# Patient Record
Sex: MALE | Race: WHITE | NOT HISPANIC OR LATINO | Employment: FULL TIME | URBAN - METROPOLITAN AREA
[De-identification: names, ages, dates, MRNs, and addresses within clinical notes are randomized per-mention and may not be internally consistent; named-entity substitution may affect disease eponyms.]

---

## 2017-02-03 ENCOUNTER — OFFICE VISIT (OUTPATIENT)
Dept: OTOLARYNGOLOGY | Facility: CLINIC | Age: 37
End: 2017-02-03
Payer: COMMERCIAL

## 2017-02-03 ENCOUNTER — PATIENT MESSAGE (OUTPATIENT)
Dept: OTOLARYNGOLOGY | Facility: CLINIC | Age: 37
End: 2017-02-03

## 2017-02-03 VITALS
HEART RATE: 85 BPM | SYSTOLIC BLOOD PRESSURE: 146 MMHG | WEIGHT: 156.94 LBS | DIASTOLIC BLOOD PRESSURE: 86 MMHG | BODY MASS INDEX: 23.87 KG/M2

## 2017-02-03 DIAGNOSIS — J38.3 VOCAL CORD MASS: Primary | ICD-10-CM

## 2017-02-03 DIAGNOSIS — J38.3 VOCAL PROCESS GRANULOMA: ICD-10-CM

## 2017-02-03 PROCEDURE — 99999 PR PBB SHADOW E&M-EST. PATIENT-LVL III: CPT | Mod: PBBFAC,,, | Performed by: OTOLARYNGOLOGY

## 2017-02-03 PROCEDURE — 31575 DIAGNOSTIC LARYNGOSCOPY: CPT | Mod: S$GLB,,, | Performed by: OTOLARYNGOLOGY

## 2017-02-03 PROCEDURE — 99213 OFFICE O/P EST LOW 20 MIN: CPT | Mod: 25,S$GLB,, | Performed by: OTOLARYNGOLOGY

## 2017-02-03 NOTE — Clinical Note
Please get him in to speech therapy.   I know he is seeing Carloz, but he should start the ST as soon as possible.  I forgot to tell him this.

## 2017-02-03 NOTE — PROGRESS NOTES
Referring Provider:    No referring provider defined for this encounter.  Subjective:   Patient: Juan Green [id number], :1980   Visit date:2/3/2017 12:18 PM    Chief Complaint:  Follow-up    HPI:  Juan is a 36 y.o. male who is here today for follow-up.  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  I last saw him in 2016 and he was doing very well.  He had a small, persistent nodular thickening at the vocal process at his 1 month visit.  I have had him on proton pump inhibitors since the time of surgery.  Additionally I recommended that he undergo speech therapy.  For unclear reasons this did not occur.  At this time he feels that his voice is fairly good and has been stable since the time of surgery.          FINAL PATHOLOGIC DIAGNOSIS  Right glottis mass:  Contact ulcer; see note.  Note: This lesion consists of hyperplastic squamous epithelium exhibiting pseudo-epitheliomatous hyperplasia,  exuberant granulation tissue with hemosiderin-laden macrophages, and acute and chronic inflammatory infiltrate in  acellular fibrinous material. There is no evidence of neoplasia.  [facility]  Diagnosed by: Carlo Hernandez M.D.  (Electronically Signed: 2016 11:12:03)      Findings at time of surgery:                                Review of Systems:  -     Allergic/Immunologic: has No Known Allergies..  -     Constitutional: Current temp:        His meds, allergies, medical, surgical, social & family histories were reviewed & updated:  -     He has a current medication list which includes the following prescription(s): fexofenadine, fluticasone, multivitamin, pantoprazole, and tegretol xr.  -     He  has a past medical history of Allergy; Asthma; and Seizures.   -     He  does not have any pertinent problems on file.   -     He  has no past surgical history on file.  -     He  reports that he quit smoking about 13 months ago. He has never used  smokeless tobacco. He reports that he does not drink alcohol or use illicit drugs.  -     His family history includes Asthma in his mother; Celiac disease in his father; Hypertension in his father; Mitral valve prolapse in his mother. There is no history of Stroke or Diabetes.  -     He has No Known Allergies.    Objective:     Physical Exam:  Vitals:    Visit Vitals    BP (!) 146/86    Pulse 85    Wt 71.2 kg (156 lb 15.5 oz)    BMI 23.87 kg/m2     General appearance:  Well developed, well nourished    Eyes:  Extraocular motions intact, PERRL    Communication:  Moderate dysphonia with a breathy quality to his voice.    Ears:  Otoscopy of external auditory canals and tympanic membranes was normal, clinical speech reception thresholds grossly intact, no mass/lesion of auricle.  Nose:  No masses/lesions of external nose, nasal mucosa, septum, and turbinates were within normal limits.  Mouth:  No mass/lesion of lips, teeth, gums, hard/soft palate, tongue, tonsils, or oropharynx.    Cardiovascular:  No pedal edema; Radial Pulses +2     Neck & Lymphatics:  No cervical lymphadenopathy, no neck mass/crepitus/ asymmetry, trachea is midline, no thyroid enlargement/tenderness/mass.    Psych: Oriented x3,  Alert with normal mood and affect.     Respiration/Chest:  Symmetric expansion during respiration, normal respiratory effort.    Skin:  Warm and intact. No ulcerations of face, scalp, neck.    Assessment & Plan:   Juan was seen today for follow-up.    Diagnoses and all orders for this visit:    Vocal cord mass  -     Ambulatory referral to ENT    Vocal process granuloma  -     Ambulatory referral to ENT      Juan is doing fairly well symptomatically but the area of concern seems to be continuing to enlarge.  I have recommended that he be evaluated by Dr. Thomas Carty.  Additionally I suspect that he would benefit greatly from speech therapy.  We will work on arranging this as well.      Patient: Juan Green  [id number], :1980  Procedure date:2/3/2017  Patient's medications, allergies, past medical, surgical, social and family histories were reviewed and updated as appropriate.  Chief Complaint:  Follow-up    HPI:  Juan is a 36 y.o. male with the history of present illness as discussed in the clinic note from today.    Procedure: Risks, benefits, and alternatives of the procedure were discussed with the patient, and the patient consented to the fiberoptic examination.  We applied a topical nasal decongestant and analgesic.  After adequate anesthesia was obtained, the flexible fiberoptic scope was passed into each nostril independently.  Each nasal cavity, the entire pharynx (nasopharynx to hypopharynx) and the larynx were visualized. At the end of the examination, the scope was removed. The patient tolerated the procedure well with no complications.     Findings:  -     Laryngeal mucosa is normal  -     Posterior commissure has no hypertrophy  -     Lingual tonsils have no hypertrophy  -     Adenoids have no hypertrophy  -     Right vocal fold: normal mobility     mass/lesion: nodular mass at vocal process without erythema or ulceration, larger than last visit, approx 6mm  -     Left vocal fold: Normal mobility     mass/lesion: none  -     Other findings: none    Assessment & Plan:  - see today's clinic note

## 2017-02-03 NOTE — MR AVS SNAPSHOT
[facility] - ENT  [address] [facility] [address] [place] [address]  Phone: [phone]  Fax: [phone]                  Juan Green   2/3/2017 10:15 AM   Office Visit    Description:  Male : 1980   Provider:  Gianni Lemon MD   Department:  [facility] - ENT           Reason for Visit     Follow-up           Diagnoses this Visit        Comments    Vocal cord mass    -  Primary     Vocal process granuloma                To Do List           Future Appointments        Provider Department Dept Phone    3/22/2017 1:00 PM Bulmaro Carty MD [facility] [phone]      Goals (5 Years of Data)     None      [facility] On [facility]     [facility] -  Assistance  Registered nurses in the [facility] Center provide clinical advisement, health education, appointment booking, and other advisory services.  Call for this free service at [phone].             Medications           Message regarding Medications     Verify the changes and/or additions to your medication regime listed below are the same as discussed with your clinician today.  If any of these changes or additions are incorrect, please notify your healthcare provider.             Verify that the below list of medications is an accurate representation of the medications you are currently taking.  If none reported, the list may be blank. If incorrect, please contact your healthcare provider. Carry this list with you in case of emergency.           Current Medications     fexofenadine (ALLEGRA) 180 MG tablet Take 90 mg by mouth once daily.     fluticasone (FLONASE) 50 mcg/actuation nasal spray instill 2 sprays into each nostril once daily    multivitamin (ONE DAILY MULTIVITAMIN) per tablet Take 1 tablet by mouth once daily.    pantoprazole (PROTONIX) 40 MG tablet Take 1 tablet (40 mg total) by mouth once daily.    TEGRETOL  mg Tb12 Take 400 mg by mouth 2 (two) times daily.           Clinical Reference  Information           Your Vitals Were     BP Pulse Weight BMI       146/86 85 71.2 kg (156 lb 15.5 oz) 23.87 kg/m2       Blood Pressure          Most Recent Value    BP  (!)  146/86      Allergies as of 2/3/2017     No Known Allergies      Immunizations Administered on Date of Encounter - 2/3/2017     None      Orders Placed During Today's Visit      Normal Orders This Visit    Ambulatory referral to ENT       Language Assistance Services     ATTENTION: Language assistance services are available, free of charge. Please call [phone].      ATENCIÓN: Si ashleyla barbie, tiene a navarro disposición servicios gratuitos de asistencia lingüística. Llame al [phone].     [facility] Ý: N?u b?n nói Ti?ng Vi?t, có các d?ch v? h? tr? ngôn ng? mi?n phí dành cho b?n. G?i s? [phone].         [facility] complies with applicable Federal civil rights laws and does not discriminate on the basis of race, color, national origin, age, disability, or sex.

## 2017-03-03 ENCOUNTER — CLINICAL SUPPORT (OUTPATIENT)
Dept: SPEECH THERAPY | Facility: HOSPITAL | Age: 37
End: 2017-03-03
Attending: OTOLARYNGOLOGY
Payer: COMMERCIAL

## 2017-03-03 DIAGNOSIS — J38.2 VOCAL CORD NODULES: Primary | ICD-10-CM

## 2017-03-03 PROCEDURE — 92524 BEHAVRAL QUALIT ANALYS VOICE: CPT | Mod: PO

## 2017-03-08 ENCOUNTER — TELEPHONE (OUTPATIENT)
Dept: OTOLARYNGOLOGY | Facility: CLINIC | Age: 37
End: 2017-03-08

## 2017-03-08 DIAGNOSIS — R49.0 DYSPHONIA: Primary | ICD-10-CM

## 2017-03-08 NOTE — TELEPHONE ENCOUNTER
----- Message from Bulmaro Carty MD sent at 3/7/2017  9:17 AM CST -----  No problem.     Can you put in an order for SLP voice eval? Brandie will get het scheduled with both of us.     Thanks,  JM    ----- Message -----     From: Gianni Lemon MD     Sent: 3/7/2017   9:08 AM       To: Bulmaro Carty MD    This is a karina that had a HUGE granuloma that I removed a few months ago.  He did fairly well but it looks like it is recurring.  I think he would benefit from seeing you as well as ST there in [place].

## 2017-03-09 NOTE — PATIENT INSTRUCTIONS
FUNCTIONAL GOALS  LTG. Pt will:  1. Reduce the frequency of abusive vocal behaviors by 80% below baseline data as measured by patient report.    STGs. Pt will:  1. Identify own abusive vocal behaviors.  2. Identify frequency/situations of own abusive behaviors.  3. Describe other behaviors which can replace vocal abuse (gentle vocal onset. reduce volume, appropriate respiration).  4. Use appropriate vocal behaviors in structured setting for 10 minutes.    RECOMMENDATIONS/PLAN OF CARE  -Recommend patient referral to a voice clinic for an instrumental voice assessment.  -Follow vocal hygiene practices as outlined on handout  -Consider voice amplifier   -Follow-up with referring physician and/or PCP as needed for medical care/management.  -Call provider at [phone] as needed

## 2017-03-22 ENCOUNTER — INITIAL CONSULT (OUTPATIENT)
Dept: OTOLARYNGOLOGY | Facility: CLINIC | Age: 37
End: 2017-03-22
Payer: COMMERCIAL

## 2017-03-22 ENCOUNTER — CLINICAL SUPPORT (OUTPATIENT)
Dept: SPEECH THERAPY | Facility: HOSPITAL | Age: 37
End: 2017-03-22
Payer: COMMERCIAL

## 2017-03-22 VITALS
BODY MASS INDEX: 23.64 KG/M2 | HEART RATE: 80 BPM | HEIGHT: 68 IN | WEIGHT: 156 LBS | DIASTOLIC BLOOD PRESSURE: 84 MMHG | SYSTOLIC BLOOD PRESSURE: 140 MMHG

## 2017-03-22 DIAGNOSIS — J38.3 VOCAL PROCESS GRANULOMA: ICD-10-CM

## 2017-03-22 DIAGNOSIS — R49.0 DYSPHONIA: Primary | ICD-10-CM

## 2017-03-22 PROCEDURE — G9171 VOICE CURRENT STATUS: HCPCS | Mod: GN,CJ | Performed by: SPEECH-LANGUAGE PATHOLOGIST

## 2017-03-22 PROCEDURE — 31579 LARYNGOSCOPY TELESCOPIC: CPT | Mod: S$GLB,,, | Performed by: OTOLARYNGOLOGY

## 2017-03-22 PROCEDURE — 99999 PR PBB SHADOW E&M-EST. PATIENT-LVL III: CPT | Mod: PBBFAC,,, | Performed by: OTOLARYNGOLOGY

## 2017-03-22 PROCEDURE — G9172 VOICE GOAL STATUS: HCPCS | Mod: GN,CH | Performed by: SPEECH-LANGUAGE PATHOLOGIST

## 2017-03-22 PROCEDURE — 99214 OFFICE O/P EST MOD 30 MIN: CPT | Mod: 25,S$GLB,, | Performed by: OTOLARYNGOLOGY

## 2017-03-22 PROCEDURE — 92520 LARYNGEAL FUNCTION STUDIES: CPT | Mod: GN,,, | Performed by: SPEECH-LANGUAGE PATHOLOGIST

## 2017-03-22 PROCEDURE — 1160F RVW MEDS BY RX/DR IN RCRD: CPT | Mod: S$GLB,,, | Performed by: OTOLARYNGOLOGY

## 2017-03-22 PROCEDURE — G9173 VOICE D/C STATUS: HCPCS | Mod: GN,CJ | Performed by: SPEECH-LANGUAGE PATHOLOGIST

## 2017-03-22 RX ORDER — PANTOPRAZOLE SODIUM 40 MG/1
40 TABLET, DELAYED RELEASE ORAL 2 TIMES DAILY
Qty: 30 TABLET | Refills: 11 | Status: SHIPPED | OUTPATIENT
Start: 2017-03-22 | End: 2018-04-03

## 2017-03-22 NOTE — Clinical Note
Dr. Lemon -- thanks for the referral.  If you agree with the plan of care (initiating voice therapy following voice rest), would you please create an addendum to my note to sign off on it?  Thanks again. AL

## 2017-03-22 NOTE — PROGRESS NOTES
"[facility]  Department of Otorhinolaryngology and Communication Sciences    Juan Green is a 36 y.o. male who presents to the Voice Center for consultation at the kind request of Dr. Gianni Lemon for further management of a vocal fold granuloma.     He complains of mild strain in his voice with prolonged use and some difficulty with his falsetto when singing. Onset was gradual. Duration is about 3 weeks. Time course is intermittent. Symptoms are slightly worsening. Exacerbating factors include singing and trying to project his voice. He denies any alleviating factors. Associated symptoms include throat clearing. He does endorse that is "probably a little bit of a bad habit."     A few months ago, he had similar symptoms which were much more severe. He saw Dr. Lemon who noted an obstructive lesion tethered to his posterior right vocal fold. He brought him for surgery on 11/16/2016. He underwent microlaryngoscopy with excision of right vocal process lesion. Path report is below:    FINAL PATHOLOGIC DIAGNOSIS  Right glottis mass:  Contact ulcer; see note.  Note: This lesion consists of hyperplastic squamous epithelium exhibiting pseudo-epitheliomatous hyperplasia,  exuberant granulation tissue with hemosiderin-laden macrophages, and acute and chronic inflammatory infiltrate in  acellular fibrinous material. There is no evidence of neoplasia.    His voice got better and was back to normal right after surgery, but it is beginning to deteriorate again. 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 He sang in the choir at [employer], but has not undergone any training since then. He is a tenor. However, he knows that, for much of his life, he has strained to hit notes which are higher than what he should. He has been on protonix daily since October. He denies any typical Ge reflux symptoms.    Voice Handicap Index-10 (VHI-10) score is 15. " "  Reflux Symptom Index (RSI) score is 7.       Past Medical History  He has a past medical history of Allergy; Asthma; and Seizures.    Past Surgical History  Epilepsy    Family History  His family history includes Asthma in his mother; Celiac disease in his father; Hypertension in his father; Mitral valve prolapse in his mother. There is no history of Stroke or Diabetes.    Social History  He reports that he quit smoking about 14 months ago. He has never used smokeless tobacco. He reports that he does not drink alcohol or use illicit drugs.    Allergies  He has No Known Allergies.    Medications  He has a current medication list which includes the following prescription(s): fexofenadine, fluticasone, multivitamin, pantoprazole, and tegretol xr.    Review of Systems   Constitutional: Positive for fever.        Recent strep infection, feeling better now   HENT: Positive for sore throat.    Eyes: Negative for visual disturbance.   Respiratory: Negative for wheezing.    Cardiovascular: Negative for chest pain.   Gastrointestinal: Negative for nausea.   Musculoskeletal: Positive for arthralgias.   Skin: Negative for rash.   Neurological: Negative for tremors.   Hematological: Does not bruise/bleed easily.   Psychiatric/Behavioral: The patient is not nervous/anxious.           Objective:     BP (!) 140/84 (BP Location: Right arm, Patient Position: Sitting, BP Method: Automatic)  Pulse 80  Ht 5' 8" (1.727 m)  Wt 70.8 kg (156 lb)  BMI 23.72 kg/m2   Physical Exam    Constitutional: comfortable, well dressed  Psychiatric: appropriate affect  Respiratory: comfortably breathing, symmetric chest rise, no stridor  Voice: normal for age and gender  Cardiovascular: upper extremities non-edematous  Lymphatic: no cervical lymphadenopathy  Neurologic: alert and oriented to time, place, person, and situation; cranial nerves 3-12 grossly intact  Head: normocephalic  Eyes: conjunctivae and sclerae clear  Ears: normal pinnae, normal " external auditory canals, tympanic membranes intact  Nose: mucosa pink and noncongested, no masses, no mucopurulence, no polyps  Oral cavity / oropharynx: no mucosal lesions  Neck: soft, full range of motion, laryngotracheal complex palpable with appropriate landmarks, larynx elevates on swallowing  Indirect laryngoscopy: limited due to gag    Procedure  Rigid Laryngeal Videostroboscopy (39697): Laryngeal videostroboscopy is indicated to assess the laryngeal vibratory biomechanics and vocal fold oscillation, which cannot be assessed with a plain light examination. This was carried out with a 70 degree endoscope. After verbal consent was obtained, the patient was positioned and the tongue was gently secured with a gauze sponge. The endoscope was passed transorally and positioned to image the larynx and hypopharynx in detail. The following featured were examined: laryngeal and hypopharyngeal masses; vocal fold range and symmetry of motion; laryngeal mucosal edema, erythema, inflammation, and hydration; salivary pooling; and gross laryngeal sensation. During phonation, the vocal folds were assesses for glottal closure; mucosal wave; vocal fold lesions; vibratory periodicity, amplitude, and phase symmetry; and vertical height match. The equipment was removed. The patient tolerated the procedure well without complication. 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       Plan:        I had a discussion with the patient regarding his condition and the further workup and management options. Management strategies for vocal process granulomas consist primarily of minimizing laryngeal inflammation and minimizing vocal process contact with the following measures: systemic steroids, anti-reflux medication,  and voice rest. If refractory to these measures, laryngeal botulinum toxin chemodenervation is usually successful in eradicating granulomas. The risks of laryngeal botulinum toxin chemodenervation were discussed at length with the patient.    At this time, he elected non-procedural treatment with acid suppression and voice rest. Due to poor tolerance of steroids, he deferred on this adjunct therapy. He will follow up with me in 2 weeks, or sooner if needed.    All questions were answered, and the patient is in agreement with the above.     Bulmaro Carty M.D.  [facility]  Department of Otorhinolaryngology and Communication Sciences

## 2017-03-22 NOTE — LETTER
March 27, 2017      Gianni Lemon MD  [address] [place] [address]           [facility]  [address]  [facility] [address]  Phone: [phone]  Fax: [phone]          Patient: Juan Green   MR Number: [id number]   YOB: 1980   Date of Visit: 3/22/2017       Dear Dr. Gianni Lemon:    Thank you for referring Juan Green to me for evaluation. Attached you will find relevant portions of my assessment and plan of care.    If you have questions, please do not hesitate to call me. I look forward to following Juan Green along with you.    Sincerely,    Bulmaro Carty MD    Enclosure  CC:  No Recipients    If you would like to receive this communication electronically, please contact [email or web address] or [phone] to request more information on [employer] Link access.    For providers and/or their staff who would like to refer a patient to [facility], please contact us through our one-stop-shop provider referral line, [facility], at [phone].    If you feel you have received this communication in error or would no longer like to receive these types of communications, please e-mail [email or web address]

## 2017-03-22 NOTE — PATIENT INSTRUCTIONS
GRANULOMA    What is a granuloma?   Granulomas are made up of grainy tissue that builds up in the larynx, usually at the back of the vocal folds near the cartilage called the vocal process. Granulomas can develop on just one or both sides. If it occurs on one side, it may hit against the other side, causing a contact lesion.   Many granulomas are caused by the reflux of stomach acid into the back of the throat, intubation, prolonged use of airway tubes after surgery, and some diseases like tuberculosis and HIV.     How is it treated?   Treatment of reflux is almost always one of the first steps to treat a granuloma, which involves a combination of medication and lifestyle changes. If the lesions do not respond to medication, treatment may proceed to a combination of microsurgery and/or voice therapy. Because granulomas commonly recur, the treatment program is usually comprehensive and aims to address several factors to reduce the chances of recurrence.         VOICE REST FOLLOWING SURGERY     After having laryngeal (voice box) surgery, your voice needs complete rest in order to heal. At your follow-up appointment, which will be 4-6 days later, we will look at your larynx and see how it is healing. Then we will discuss a modified voice rest plan to gradually increase your voice use. This schedule is a basic guideline; specific time periods for complete and modified voice rest will be tailored to you.     OVERALL GUIDELINES FOR VOICE REST   · Avoid coughing or throat clearing. If you feel the need to clear your throat, take a sip of water and swallow hard.   · Avoid strenuous exercise or activity. Any noise or straining activity to your vocal cords during this time can be damaging and affect how your vocal folds heal.   · Remain hydrated. Drink 8-10 glasses of water per day. Avoid caffeine, alcohol, and mentholated cough drops.   · Breathe steam several times per day, either from your shower, sink, or a personal  humidifier.   · If you have reflux, follow diet precautions and take medicine as prescribed.   · Avoid first- and second-hand smoke.     POST-OP VOICE :   Week 1 following surgery  Complete voice rest     · Avoid talking, whispering, throat clearing, coughing, singing, humming, laughing, whistling, or playing musical instruments.   · Use pen and paper to write message, or try an ramón on your smart phone/tablet. -- iPhone apps: [employer] have it!, [employer] [place] Text to Speech.  Android apps: Text to Speech toy.  · Arrange for appropriate  support.   · Change your outgoing voicemail message to redirect callers to email or text.      Keep in mind that this is a personalized progression. If you have any trouble, back up and do not progress until you are ready. Do not keep talking if your voice wears out or feels tired.         ACID REFLUX   What is acid reflux?    When we eat, food passes from the throat and into the stomach through a tube called the esophagus. At the bottom of the esophagus is a ring of muscles that acts as a valve between the esophagus and stomach, called the lower esophageal sphincter. Smoking, alcohol, and certain types of food may weaken the sphincter, so it may stop closing properly. The contents in the stomach then may leak back, or reflux, into the esophagus. This problem is called gastroesophageal reflux disease (GERD). Symptoms of GERD include heartburn, belching, regurgitation of stomach contents, and swallowing difficulties.    Sometimes, the stomach acid travels up through the esophagus and spills into the larynx or pharynx (voice box). This is called laryngopharyngeal reflux (LPR) and is irritating to the vocal folds and surrounding tissues. Often, patients with LPR do not experience heartburn as a symptom. More commonly, symptoms of LPR include hoarseness, excessive mucous resulting in frequent throat clearing, post-nasal drip, coughing, throat soreness or burning, choking episodes,  difficulty swallowing, and sensation of a lump in the throat.     How is acid reflux treated?   Treatment for acid reflux can involve any combination of medication, lifestyle modifications, and surgery.   · Medications. Your doctor may prescribe a proton pump inhibitor (PPI) or an H2 blocker. If you are prescribed a PPI, take in on an empty stomach in the morning 30 minutes prior to eating breakfast. Keep in mind that it may take 4-6 weeks before symptoms begin to resolve, so do not stop medications without consulting your doctor.   · Lifestyle and dietary modifications. Eat smaller meals at a slower pace. Avoid over-eating. If you are overweight, try to lose weight. Do not lie down or exercise directly after eating; eat your last meal of the day at least 2-3 hours prior to going to sleep. Avoid tight-fitting clothes. If you are a smoker, reduce or quit smoking. Elevate your head of bed 4-6 inches by putting phone books under the legs at the head of your bed or buy a wedge pillow, but do not use more than two regular pillows as this causes the body to curl and compresses your stomach.     Food group Foods to avoid to reduce reflux   Beverages  Whole milk, 2% milk, chocolate milk/hot chocolate, alcohol, coffee (regular and decaf), caffeinated tea, mint tea, carbonated beverages, citrus juice    Breads/grains Commercial sweet rolls, doughnuts, croissants, and other high-fat pastries    Fruits and vegetables Fried or cream-style vegetables, tomatoes, tomato-based products, citrus fruits, hot peppers    Soups and seasonings Cream, cheese, tomato-based soups, vinegar    Meats and proteins Fatty or fried meat/fish, min, sausage, pepperoni, lunch meat, fried eggs    Fats and oil Lard, min drippings, salt pork, meat drippings, gravies, highly seasoned salad dressings, nuts    Sweets/desserts Anything made with or from chocolate, peppermint, spearmint, whole milk, or cream; high-fat pastries, gum, hard candy

## 2017-03-22 NOTE — MR AVS SNAPSHOT
[facility]  [address]  [facility] [address]  Phone: [phone]  Fax: [phone]                  Juan Green   3/22/2017 1:00 PM   Initial consult    Description:  Male : 1980   Provider:  Bulmaro Carty MD   Department:  [facility]           Reason for Visit     Mass           Diagnoses this Visit        Comments    Dysphonia    -  Primary     Vocal process granuloma                To Do List           Goals (5 Years of Data)     None      Follow-Up and Disposition     Return in about 2 weeks (around 2017).       These Medications        Disp Refills Start End    pantoprazole (PROTONIX) 40 MG tablet 30 tablet 11 3/22/2017 3/22/2018    Take 1 tablet (40 mg total) by mouth 2 (two) times daily. - Oral    Pharmacy: [facility]/pharmacy #[id number] [address] Ph #: [phone]       ranitidine (ZANTAC) 300 MG tablet 30 tablet 11 3/22/2017 3/22/2018    Take 1 tablet (300 mg total) by mouth every evening. - Oral    Pharmacy: [facility]/pharmacy #[id number] [address] Ph #: [phone]         [facility]     [facility] On Call Nurse Care Line -  Assistance  Registered nurses in the [facility] Center provide clinical advisement, health education, appointment booking, and other advisory services.  Call for this free service at [phone].             Medications           Message regarding Medications     Verify the changes and/or additions to your medication regime listed below are the same as discussed with your clinician today.  If any of these changes or additions are incorrect, please notify your healthcare provider.        START taking these NEW medications        Refills    ranitidine (ZANTAC) 300 MG tablet 11    Sig: Take 1 tablet (300 mg total) by mouth every evening.    Class: Normal    Route: Oral      CHANGE how you are taking  "these medications     Start Taking Instead of    pantoprazole (PROTONIX) 40 MG tablet pantoprazole (PROTONIX) 40 MG tablet    Dosage:  Take 1 tablet (40 mg total) by mouth 2 (two) times daily. Dosage:  Take 1 tablet (40 mg total) by mouth once daily.    Reason for Change:  Reorder            Verify that the below list of medications is an accurate representation of the medications you are currently taking.  If none reported, the list may be blank. If incorrect, please contact your healthcare provider. Carry this list with you in case of emergency.           Current Medications     fexofenadine (ALLEGRA) 180 MG tablet Take 90 mg by mouth once daily.     fluticasone (FLONASE) 50 mcg/actuation nasal spray instill 2 sprays into each nostril once daily    multivitamin (ONE DAILY MULTIVITAMIN) per tablet Take 1 tablet by mouth once daily.    pantoprazole (PROTONIX) 40 MG tablet Take 1 tablet (40 mg total) by mouth 2 (two) times daily.    ranitidine (ZANTAC) 300 MG tablet Take 1 tablet (300 mg total) by mouth every evening.    TEGRETOL  mg Tb12 Take 400 mg by mouth 2 (two) times daily.           Clinical Reference Information           Your Vitals Were     BP Pulse Height Weight BMI    140/84 (BP Location: Right arm, Patient Position: Sitting, BP Method: Automatic) 80 5' 8" (1.727 m) 70.8 kg (156 lb) 23.72 kg/m2      Blood Pressure          Most Recent Value    BP  (!)  140/84      Allergies as of 3/22/2017     No Known Allergies      Immunizations Administered on Date of Encounter - 3/22/2017     None      Instructions      GRANULOMA    What is a granuloma?   Granulomas are made up of grainy tissue that builds up in the larynx, usually at the back of the vocal folds near the cartilage called the vocal process. Granulomas can develop on just one or both sides. If it occurs on one side, it may hit against the other side, causing a contact lesion.   Many granulomas are caused by the reflux of stomach acid into the " back of the throat, intubation, prolonged use of airway tubes after surgery, and some diseases like tuberculosis and HIV.     How is it treated?   Treatment of reflux is almost always one of the first steps to treat a granuloma, which involves a combination of medication and lifestyle changes. If the lesions do not respond to medication, treatment may proceed to a combination of microsurgery and/or voice therapy. Because granulomas commonly recur, the treatment program is usually comprehensive and aims to address several factors to reduce the chances of recurrence.         VOICE REST FOLLOWING SURGERY     After having laryngeal (voice box) surgery, your voice needs complete rest in order to heal. At your follow-up appointment, which will be 4-6 days later, we will look at your larynx and see how it is healing. Then we will discuss a modified voice rest plan to gradually increase your voice use. This schedule is a basic guideline; specific time periods for complete and modified voice rest will be tailored to you.     OVERALL GUIDELINES FOR VOICE REST   · Avoid coughing or throat clearing. If you feel the need to clear your throat, take a sip of water and swallow hard.   · Avoid strenuous exercise or activity. Any noise or straining activity to your vocal cords during this time can be damaging and affect how your vocal folds heal.   · Remain hydrated. Drink 8-10 glasses of water per day. Avoid caffeine, alcohol, and mentholated cough drops.   · Breathe steam several times per day, either from your shower, sink, or a personal humidifier.   · If you have reflux, follow diet precautions and take medicine as prescribed.   · Avoid first- and second-hand smoke.     POST-OP VOICE :   Week 1 following surgery  Complete voice rest     · Avoid talking, whispering, throat clearing, coughing, singing, humming, laughing, whistling, or playing musical instruments.   · Use pen and paper to write message, or try an ramón on your  smart phone/tablet. -- iPhone apps: Player X, [employer] Text to Speech.  Android apps: Text to Speech toy.  · Arrange for appropriate  support.   · Change your outgoing voicemail message to redirect callers to email or text.      Keep in mind that this is a personalized progression. If you have any trouble, back up and do not progress until you are ready. Do not keep talking if your voice wears out or feels tired.         ACID REFLUX   What is acid reflux?    When we eat, food passes from the throat and into the stomach through a tube called the esophagus. At the bottom of the esophagus is a ring of muscles that acts as a valve between the esophagus and stomach, called the lower esophageal sphincter. Smoking, alcohol, and certain types of food may weaken the sphincter, so it may stop closing properly. The contents in the stomach then may leak back, or reflux, into the esophagus. This problem is called gastroesophageal reflux disease (GERD). Symptoms of GERD include heartburn, belching, regurgitation of stomach contents, and swallowing difficulties.    Sometimes, the stomach acid travels up through the esophagus and spills into the larynx or pharynx (voice box). This is called laryngopharyngeal reflux (LPR) and is irritating to the vocal folds and surrounding tissues. Often, patients with LPR do not experience heartburn as a symptom. More commonly, symptoms of LPR include hoarseness, excessive mucous resulting in frequent throat clearing, post-nasal drip, coughing, throat soreness or burning, choking episodes, difficulty swallowing, and sensation of a lump in the throat.     How is acid reflux treated?   Treatment for acid reflux can involve any combination of medication, lifestyle modifications, and surgery.   · Medications. Your doctor may prescribe a proton pump inhibitor (PPI) or an H2 blocker. If you are prescribed a PPI, take in on an empty stomach in the morning 30 minutes prior to eating  breakfast. Keep in mind that it may take 4-6 weeks before symptoms begin to resolve, so do not stop medications without consulting your doctor.   · Lifestyle and dietary modifications. Eat smaller meals at a slower pace. Avoid over-eating. If you are overweight, try to lose weight. Do not lie down or exercise directly after eating; eat your last meal of the day at least 2-3 hours prior to going to sleep. Avoid tight-fitting clothes. If you are a smoker, reduce or quit smoking. Elevate your head of bed 4-6 inches by putting phone books under the legs at the head of your bed or buy a wedge pillow, but do not use more than two regular pillows as this causes the body to curl and compresses your stomach.     Food group Foods to avoid to reduce reflux   Beverages  Whole milk, 2% milk, chocolate milk/hot chocolate, alcohol, coffee (regular and decaf), caffeinated tea, mint tea, carbonated beverages, citrus juice    Breads/grains Commercial sweet rolls, doughnuts, croissants, and other high-fat pastries    Fruits and vegetables Fried or cream-style vegetables, tomatoes, tomato-based products, citrus fruits, hot peppers    Soups and seasonings Cream, cheese, tomato-based soups, vinegar    Meats and proteins Fatty or fried meat/fish, min, sausage, pepperoni, lunch meat, fried eggs    Fats and oil Lard, min drippings, salt pork, meat drippings, gravies, highly seasoned salad dressings, nuts    Sweets/desserts Anything made with or from chocolate, peppermint, spearmint, whole milk, or cream; high-fat pastries, gum, hard candy          Language Assistance Services     ATTENTION: Language assistance services are available, free of charge. Please call [phone].      ATENCIÓN: Si habla español, tiene a navarro disposición servicios gratuitos de asistencia lingüística. Llame al [phone].     KASH Ý: N?u b?n nói Ti?ng Vi?t, có các d?ch v? h? tr? ngôn ng? mi?n phí dành cho b?n. G?i s? [phone].         Shakir Joaquin -  [facility] complies with applicable Federal civil rights laws and does not discriminate on the basis of race, color, national origin, age, disability, or sex.

## 2017-03-22 NOTE — PROGRESS NOTES
"Referring provider: Dr. Gianni Lemon  Reason for visit:  Laryngeal function studies (CPT 01644)    Subjective / History    Juan Green is a 36 y.o. male referred for laryngeal function studies (CPT 66070) by Dr. Lemon.  He is s/p direct laryngoscopy with excision of large mass off the right true vocal fold with attachment along the posterior 1/3 of the vocal fold on 11/16.  Per Dr. Lemon's recent note, lesion was enlarging at his 2-3 month f/u visit.  He underwent SLP voice eval on 3/3/17 and was [facility] to the [facility] for further evaluation.  See Dr. Carty's note from 3/22 for stroboscopic findings -- noted R vocal process bilobed granuloma.      Past Medical History:   Diagnosis Date    Allergy     Asthma     childhood    Seizures     last seizure 2007     Current Outpatient Prescriptions on File Prior to Visit   Medication Sig Dispense Refill    fexofenadine (ALLEGRA) 180 MG tablet Take 90 mg by mouth once daily.       fluticasone (FLONASE) 50 mcg/actuation nasal spray instill 2 sprays into each nostril once daily 16 g 2    multivitamin (ONE DAILY MULTIVITAMIN) per tablet Take 1 tablet by mouth once daily.      pantoprazole (PROTONIX) 40 MG tablet Take 1 tablet (40 mg total) by mouth once daily. 30 tablet 11    TEGRETOL  mg Tb12 Take 400 mg by mouth 2 (two) times daily.  0     No current facility-administered medications on file prior to visit.        Objective    Perceptual/behavioral assessment  -Quality: appropriate for age and gender  -Volume: appropriate for age and gender  -Pitch: appropriate for age and gender  -Flexibility: appropriate for age and gender  -Habitual respiratory pattern: diaphragmatic.    Acoustic/aerodynamic assessment  Multi-Dimensional Voice Program (MDVP) Analysis (sustained "ah")   Baseline Gender-matched norms (M)   Average fundamental frequency (Fo) 159.1 Hz Norm/SD: 145.2 / 23.41     Relative average perturbation 0.275% Norm/SD: 0.345 / 0.33     Shimmer percent " 2.332% Norm/SD: 2.52 / 0.997     Noise to harmonic ratio 0.124 Norm/SD: 0.12 / 0.014     Voice turbulence index 0.04 Norm/SD: 0.05 / 0.016       Phonatory Aerodynamic System (PAS) Analysis (running speech)  Maximum SPL   82.90  dB  Mean Pitch   117.03 Hz  Pitch Range   164.17 Hz  Phonation Time  11.28  Sec  Expiratory Airflow Duration 15.12  Sec  Inspiratory Airflow Duration 2.17  Sec  Peak Expiratory Airflow 1.05  Lit/Sec  Expiratory Volume  2.36  Liters  Peak Inspiratory Airflow -1.67  Lit/Sec  Inspiratory Volume  -1.37  Liters    Education / Stimulability Trials   Discussed importance of vocal hygiene including: hydration, conservation and reducing throat clearing, coughing, other phonotraumatic behaviors. Discussed protocol for modified voice rest, which he would like to try for the next 1-2 weeks in order to reduce size of granuloma.  Discussed GERD precautions including foods to avoid and strategies including avoiding laying down on an empty stomach or leaning over soon after a meal.   Also instructed pt on straw phonation exercises for reduced post glottic impact during phonation and to reduce extralaryngeal tension on phonation.  Discussed cough suppression/swallowing in place of coughing.     Functional goals  Length Status Goal   Long term Initiated Patient will implement and adhere to vocal hygiene protocols on a daily basis, including the elimination of phonotraumatic behaviors.    Long term Initiated Patient and clinician will facilitate changes in vocal function in order to restore functional use of voice for daily occupational, social, and emotional demands.    Long term Initiated Patient will re-establish phonation with adequate balance of airflow and resonance with decreased muscle tension.    Short term Initiated Patient will adhere to voice rest per protocol for 2 weeks.       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 Lemon.   Prognosis for continued improvement is good.     G-Codes for Voice  Current status:   - CJ   Projected status:  - CH   Discharge status:  - CJ     Recommendations / POC    Recommend 2-3 sessions of voice/speech therapy over 3-5 weeks with a speech-language pathologist to optimize glottal postures for improved vocal function, vocal efficiency, and ease of phonation.  He plans to follow up in two weeks for re-assessment of larynx with Dr. Carty and initiation of voice therapy.

## 2017-03-31 ENCOUNTER — PATIENT MESSAGE (OUTPATIENT)
Dept: FAMILY MEDICINE | Facility: CLINIC | Age: 37
End: 2017-03-31

## 2017-03-31 DIAGNOSIS — G40.909 SEIZURE DISORDER: Primary | ICD-10-CM

## 2017-03-31 NOTE — TELEPHONE ENCOUNTER
Please schedule with neurology and write a letter stating that the patient is under my care and has not smoked since January 2016.

## 2017-04-03 ENCOUNTER — PATIENT MESSAGE (OUTPATIENT)
Dept: FAMILY MEDICINE | Facility: CLINIC | Age: 37
End: 2017-04-03

## 2017-04-05 ENCOUNTER — OFFICE VISIT (OUTPATIENT)
Dept: OTOLARYNGOLOGY | Facility: CLINIC | Age: 37
End: 2017-04-05
Payer: COMMERCIAL

## 2017-04-05 VITALS
SYSTOLIC BLOOD PRESSURE: 122 MMHG | TEMPERATURE: 97 F | BODY MASS INDEX: 24.07 KG/M2 | WEIGHT: 158.31 LBS | DIASTOLIC BLOOD PRESSURE: 75 MMHG | HEART RATE: 71 BPM

## 2017-04-05 DIAGNOSIS — R49.0 DYSPHONIA: Primary | ICD-10-CM

## 2017-04-05 DIAGNOSIS — R49.9 VOICE DISTURBANCE: ICD-10-CM

## 2017-04-05 DIAGNOSIS — J38.5 SPASM LARYNX: ICD-10-CM

## 2017-04-05 PROCEDURE — 31579 LARYNGOSCOPY TELESCOPIC: CPT | Mod: S$GLB,,, | Performed by: OTOLARYNGOLOGY

## 2017-04-05 PROCEDURE — 99999 PR PBB SHADOW E&M-EST. PATIENT-LVL III: CPT | Mod: PBBFAC,,, | Performed by: OTOLARYNGOLOGY

## 2017-04-05 PROCEDURE — 1160F RVW MEDS BY RX/DR IN RCRD: CPT | Mod: S$GLB,,, | Performed by: OTOLARYNGOLOGY

## 2017-04-05 PROCEDURE — 99214 OFFICE O/P EST MOD 30 MIN: CPT | Mod: 25,S$GLB,, | Performed by: OTOLARYNGOLOGY

## 2017-04-05 NOTE — PATIENT INSTRUCTIONS
The purpose of the Botox injection into the larynx is to cause a temporary weakening of the vocal fold muscles in order to reduce the voice spasms.  It takes several days to begin working, but after the first few days you will notice a decrease in the spasms.      Basic Botox guidelines  1. Within 24-72 hours, you should expect your voice to become weak and breathy.  After one week, call into the Voice Center at [phone] and leave a message so our team can hear your voice and  the effectiveness of the injection.   We will not call you back unless you ask us to do so.      2. After 1-2 weeks, your voice should start to get stronger and have fewer spasms.  The stronger voice typically lasts for about three months, but there is some variability.    3. You may experience some difficulties with swallowing for the first week after your injection, but these should be short-lived.  Take extra care when drinking thin liquids such as water, coffee, juice, soda, and tea.  If you do find yourself coughing frequently with liquids, try tucking your chin while you swallow, or thickening your liquids.  Thick-It, a liquid thickener, can be purchased at most pharmacies.    If you have any questions, please call our office at [phone], or contact us through the [facility] portal.

## 2017-04-05 NOTE — MR AVS SNAPSHOT
[facility]  [address]  [facility] [address]  Phone: [phone]  Fax: [phone]                  Juan Green   2017 4:00 PM   Office Visit    Description:  Male : 1980   Provider:  Bulmaro Carty MD   Department:  [facility]           Reason for Visit     Follow-up           Diagnoses this Visit        Comments    Dysphonia    -  Primary     Voice disturbance         Spasm larynx                To Do List           Future Appointments        Provider Department Dept Phone    2017 2:00 PM Neeru Zaragoza MD [facility] Neurology [phone]      Goals (5 Years of Data)     None      Follow-Up and Disposition     Return for laryngeal Botox injection.      [facility]     [facility] On [facility] Nurse Care Line -  Assistance  Unless otherwise directed by your provider, please contact [facility], our nurse care line that is available for  assistance.     Registered nurses in the [facility] Call Center provide: appointment scheduling, clinical advisement, health education, and other advisory services.  Call: [phone] (toll free)               Medications           Message regarding Medications     Verify the changes and/or additions to your medication regime listed below are the same as discussed with your clinician today.  If any of these changes or additions are incorrect, please notify your healthcare provider.             Verify that the below list of medications is an accurate representation of the medications you are currently taking.  If none reported, the list may be blank. If incorrect, please contact your healthcare provider. Carry this list with you in case of emergency.           Current Medications     fexofenadine (ALLEGRA) 180 MG tablet Take 90 mg by mouth once daily.     fluticasone (FLONASE) 50 mcg/actuation nasal spray instill 2 sprays into each nostril once daily    multivitamin (ONE  DAILY MULTIVITAMIN) per tablet Take 1 tablet by mouth once daily.    pantoprazole (PROTONIX) 40 MG tablet Take 1 tablet (40 mg total) by mouth 2 (two) times daily.    ranitidine (ZANTAC) 300 MG tablet Take 1 tablet (300 mg total) by mouth every evening.    TEGRETOL  mg Tb12 Take 400 mg by mouth 2 (two) times daily.           Clinical Reference Information           Your Vitals Were     BP Pulse Temp Weight BMI    122/75 71 96.9 °F (36.1 °C) 71.8 kg (158 lb 4.6 oz) 24.07 kg/m2      Blood Pressure          Most Recent Value    BP  122/75      Allergies as of 4/5/2017     No Known Allergies      Immunizations Administered on Date of Encounter - 4/5/2017     None      Instructions                The purpose of the Botox injection into the larynx is to cause a temporary weakening of the vocal fold muscles in order to reduce the voice spasms.  It takes several days to begin working, but after the first few days you will notice a decrease in the spasms.      Basic Botox guidelines  1. Within 24-72 hours, you should expect your voice to become weak and breathy.  After one week, call into the Voice Center at [phone] and leave a message so our team can hear your voice and  the effectiveness of the injection.   We will not call you back unless you ask us to do so.      2. After 1-2 weeks, your voice should start to get stronger and have fewer spasms.  The stronger voice typically lasts for about three months, but there is some variability.    3. You may experience some difficulties with swallowing for the first week after your injection, but these should be short-lived.  Take extra care when drinking thin liquids such as water, coffee, juice, soda, and tea.  If you do find yourself coughing frequently with liquids, try tucking your chin while you swallow, or thickening your liquids.  Thick-It, a liquid thickener, can be purchased at most pharmacies.    If you have any questions, please call our office at  [phone], or contact us through the [facility] portal.           Language Assistance Services     ATTENTION: Language assistance services are available, free of charge. Please call [phone].      ATENCIÓN: Si ata valentin, tiene a navarro disposición servicios gratuitos de asistencia lingüística. Llame al [phone].     CHÚ Ý: N?u b?n nói Ti?ng Vi?t, có các d?ch v? h? tr? ngôn ng? mi?n phí dành cho b?n. G?i s? [phone].         [facility] complies with applicable Federal civil rights laws and does not discriminate on the basis of race, color, national origin, age, disability, or sex.

## 2017-04-10 NOTE — PROGRESS NOTES
[facility]  Department of Otorhinolaryngology and Communication Sciences    Subjective:      Juan Green is a 36 y.o. male who presents for follow-up. 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 Path was benign.    Since his last visit, he has adhered to complete voice rest. He is maintaining acid-suppression. He has deferred on PO steroids due to poor tolerance. He began speaking yesterday and notes that his voice quality is weak and irregular.    The patient's medications, allergies, past medical, surgical, social and family histories were reviewed and updated as appropriate.    A detailed review of systems was obtained with pertinent positives as per the above HPI, and otherwise negative.      Objective:     /75  Pulse 71  Temp 96.9 °F (36.1 °C)  Wt 71.8 kg (158 lb 4.6 oz)  BMI 24.07 kg/m2     Constitutional: comfortable, well dressed  Psychiatric: appropriate affect  Respiratory: comfortably breathing, symmetric chest rise, no stridor  Voice: mild asthenia, mild strain, trace breathiness  Head: normocephalic  Eyes: conjunctivae and sclerae clear  Indirect laryngoscopy is limited due to gag    Procedure  Rigid Laryngeal Videostroboscopy (38375): Laryngeal videostroboscopy is indicated to assess the laryngeal vibratory biomechanics and vocal fold oscillation, which cannot be assessed with a plain light examination. This was carried out with a 70 degree endoscope. After verbal consent was obtained, the patient was positioned and the tongue was gently secured with a gauze sponge. The endoscope was passed transorally and positioned to image the larynx and hypopharynx in detail. The following featured were examined: laryngeal and hypopharyngeal masses; vocal fold range and symmetry of motion; laryngeal mucosal edema, erythema, inflammation, and hydration; salivary pooling; and gross laryngeal sensation. During phonation, the vocal folds  were assesses for glottal closure; mucosal wave; vocal fold lesions; vibratory periodicity, amplitude, and phase symmetry; and vertical height match. The equipment was removed. The patient tolerated the procedure well without complication. All findings were normal except:  - bilobed right vocal process granuloma, pale  - premature contact, impairing posterior glottic closure  - trace glottal insufficiency  - concentric supraglottic hyperfunction/larynegal spasm      Assessment:     Juan Green is a 36 y.o. 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 This has failed conservative treatment approach.     Plan:     Options discussed with the patient included no treatment, office-based laryngeal Botox injection, or surgical treatment. Should he desire surgical treatment, I would recommend adjunct Botox and/or steroid injection. The risks and benefits of each approach were discussed with the patient. He desired office-based Botox injection. The risks of laryngeal botulinum toxin chemodenervation were discussed at length with the patient. Risks included but were not limited to pain, bleeding, infection, worsening of voice, worsening of swallowing, dysphagia, aspiration, shortness of breath, noisy breathing, airway obstruction, need for additional injections or procedures, reaction to medication, and development of tolerance to the medication. All questions were answered, and the patient wishes to proceed. We will arrange this in the coming weeks.    Bulmaro Carty M.D.  [facility]  Department of Otorhinolaryngology and Communication Sciences

## 2017-04-19 ENCOUNTER — PROCEDURE VISIT (OUTPATIENT)
Dept: OTOLARYNGOLOGY | Facility: CLINIC | Age: 37
End: 2017-04-19
Payer: COMMERCIAL

## 2017-04-19 VITALS
SYSTOLIC BLOOD PRESSURE: 142 MMHG | HEART RATE: 76 BPM | TEMPERATURE: 97 F | DIASTOLIC BLOOD PRESSURE: 84 MMHG | RESPIRATION RATE: 20 BRPM

## 2017-04-19 DIAGNOSIS — J38.3 VOCAL PROCESS GRANULOMA: ICD-10-CM

## 2017-04-19 DIAGNOSIS — R49.9 VOICE DISTURBANCE: ICD-10-CM

## 2017-04-19 DIAGNOSIS — J38.5 SPASM LARYNX: Primary | ICD-10-CM

## 2017-04-19 PROCEDURE — 64617 CHEMODENER MUSCLE LARYNX EMG: CPT | Mod: 50,S$GLB,, | Performed by: OTOLARYNGOLOGY

## 2017-04-19 NOTE — PROCEDURES
Procedures   [facility]  Department of Otorhinolaryngology and Communication Sciences    Preoperative Diagnosis: 1. Vocal fold granuloma.  2. Laryngeal spasm.    Postoperative Diagnosis: 1. Vocal fold granuloma.  2. Laryngeal spasm.    Procedure: Chemodenervation of larynx, percutaneous, under guidance of needle electromyography, bilateral thyroarytenoid/lateral cricoarytenoid complex.    Toxin serotype used: Botox.    Total units employed:   1. Left TA/LCA - 2.5 units.  2. Right TA/LCA - 2.5 units.  3. 45 units wasted.    Surgeon: Bulmaro Carty M.D.     Estimated blood loss: None.    Anesthesia: Local.     Complications: None.    Procedure in detail: Juan Green was positively identified with two identifiers and was seated upright in a comfortable position. Final time-out was performed for verification purposes. Local cutaneous and subcutaneous anesthesia was achieved with 1 mL of 1% lidocaine with epinephrine 1:100,000, injected into the skin and subcutaneous tissues at the level of the cricothyroid membrane. Surface electrodes were connected. Botulinum toxin was reconstituted with sterile normal saline at a concentration of 25 units/mL. A 1 mL tuberculin syringe pre-loaded with botulinum toxin was connected to a 37 mm 26-gauge injectable needle electrode. The needle was advanced percutaneously into the targeted muscle groups. Appropriate insertional activity and recruitment were noted based on visual and auditory feedback of electromyography. Under electromyographic guidance, botulinum toxin was administered, with the quantity of toxin used and muscle groups targeted outlined above. The equipment was removed. The patient tolerated the procedure well without complication. All needle counts were correct at the completion of the procedure. The patient was observed briefly before being discharged home in good condition.    Bulmaro Carty M.D.  [facility]  Department of  Otorhinolaryngology and Communication Sciences

## 2017-04-19 NOTE — MR AVS SNAPSHOT
[facility]  [address]  [facility] [address]  Phone: [phone]  Fax: [phone]                  Juan Green   2017 8:00 AM   Procedure visit    Description:  Male : 1980   Provider:  Bulmaro Carty MD   Department:  [facility]           Diagnoses this Visit        Comments    Spasm larynx    -  Primary     Vocal process granuloma         Voice disturbance                To Do List           Goals (5 Years of Data)     None      Follow-Up and Disposition     Return in about 2 months (around 2017).      [facility] On [facility]     [facility] On Call Nurse Care Line -  Assistance  Unless otherwise directed by your provider, please contact [facility] On-Call, our nurse care line that is available for  assistance.     Registered nurses in the [facility] On Call Center provide: appointment scheduling, clinical advisement, health education, and other advisory services.  Call: [phone] (toll free)               Medications           Message regarding Medications     Verify the changes and/or additions to your medication regime listed below are the same as discussed with your clinician today.  If any of these changes or additions are incorrect, please notify your healthcare provider.             Verify that the below list of medications is an accurate representation of the medications you are currently taking.  If none reported, the list may be blank. If incorrect, please contact your healthcare provider. Carry this list with you in case of emergency.           Current Medications     fexofenadine (ALLEGRA) 180 MG tablet Take 90 mg by mouth once daily.     fluticasone (FLONASE) 50 mcg/actuation nasal spray instill 2 sprays into each nostril once daily    multivitamin (ONE DAILY MULTIVITAMIN) per tablet Take 1 tablet by mouth once daily.    pantoprazole (PROTONIX) 40 MG tablet Take 1 tablet (40 mg total) by mouth 2 (two)  times daily.    ranitidine (ZANTAC) 300 MG tablet Take 1 tablet (300 mg total) by mouth every evening.    TEGRETOL  mg Tb12 Take 400 mg by mouth 2 (two) times daily.           Clinical Reference Information           Allergies as of 4/19/2017     No Known Allergies      Immunizations Administered on Date of Encounter - 4/19/2017     None      Instructions    Call with questions       Language Assistance Services     ATTENTION: Language assistance services are available, free of charge. Please call [phone].      ATENCIÓN: Si ashleyla barbie, tiene a navarro disposición servicios gratuitos de asistencia lingüística. Llame al [phone].     [facility] Ý: N?u b?n nói Ti?ng Vi?t, có các d?ch v? h? tr? ngôn ng? mi?n phí dành cho b?n. G?i s? [phone].         [facility] complies with applicable Federal civil rights laws and does not discriminate on the basis of race, color, national origin, age, disability, or sex.

## 2017-04-24 ENCOUNTER — PATIENT MESSAGE (OUTPATIENT)
Dept: OTOLARYNGOLOGY | Facility: CLINIC | Age: 37
End: 2017-04-24

## 2017-05-19 ENCOUNTER — PATIENT MESSAGE (OUTPATIENT)
Dept: OTOLARYNGOLOGY | Facility: CLINIC | Age: 37
End: 2017-05-19

## 2017-05-22 RX ORDER — FLUTICASONE PROPIONATE 50 MCG
2 SPRAY, SUSPENSION (ML) NASAL DAILY
Qty: 16 G | Refills: 2 | Status: SHIPPED | OUTPATIENT
Start: 2017-05-22 | End: 2017-09-21 | Stop reason: SDUPTHER

## 2017-06-23 ENCOUNTER — OFFICE VISIT (OUTPATIENT)
Dept: OTOLARYNGOLOGY | Facility: CLINIC | Age: 37
End: 2017-06-23
Payer: COMMERCIAL

## 2017-06-23 VITALS
SYSTOLIC BLOOD PRESSURE: 139 MMHG | TEMPERATURE: 97 F | DIASTOLIC BLOOD PRESSURE: 84 MMHG | HEART RATE: 79 BPM | BODY MASS INDEX: 24.37 KG/M2 | WEIGHT: 160.25 LBS

## 2017-06-23 DIAGNOSIS — J38.3 VOCAL PROCESS GRANULOMA: Primary | ICD-10-CM

## 2017-06-23 PROCEDURE — 99499 UNLISTED E&M SERVICE: CPT | Mod: S$GLB,,, | Performed by: OTOLARYNGOLOGY

## 2017-06-23 PROCEDURE — 31579 LARYNGOSCOPY TELESCOPIC: CPT | Mod: S$GLB,,, | Performed by: OTOLARYNGOLOGY

## 2017-06-23 PROCEDURE — 99999 PR PBB SHADOW E&M-EST. PATIENT-LVL III: CPT | Mod: PBBFAC,,, | Performed by: OTOLARYNGOLOGY

## 2017-06-23 NOTE — PROGRESS NOTES
[facility]  Department of Otorhinolaryngology and Communication Sciences    Subjective:      Juan Green is a 36 y.o. male who presents for follow-up. 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 Path was benign. It was refractory to conservative/medical management.    He underwent laryngeal botox injection 4/19/2017. He experienced about a 1 month breathy period but had no difficulty with swallowing. His voice has now recovered to nearly normal levels. He now notes only some mild range restriction when singing.    The patient's medications, allergies, past medical, surgical, social and family histories were reviewed and updated as appropriate.    A detailed review of systems was obtained with pertinent positives as per the above HPI, and otherwise negative.      Objective:     /84 (Patient Position: Sitting)   Pulse 79   Temp 97.1 °F (36.2 °C) (Tympanic)   Wt 72.7 kg (160 lb 4.4 oz)   BMI 24.37 kg/m²      Constitutional: comfortable, well dressed  Psychiatric: appropriate affect  Respiratory: comfortably breathing, symmetric chest rise, no stridor  Voice: normal for age/gender  Head: normocephalic  Eyes: conjunctivae and sclerae clear  Indirect laryngoscopy is limited due to gag    Procedure  Rigid Laryngeal Videostroboscopy (09437): Laryngeal videostroboscopy is indicated to assess the laryngeal vibratory biomechanics and vocal fold oscillation, which cannot be assessed with a plain light examination. This was carried out with a 70 degree endoscope. After verbal consent was obtained, the patient was positioned and the tongue was gently secured with a gauze sponge. The endoscope was passed transorally and positioned to image the larynx and hypopharynx in detail. The following featured were examined: laryngeal and hypopharyngeal masses; vocal fold range and symmetry of motion; laryngeal mucosal edema, erythema, inflammation, and hydration;  salivary pooling; and gross laryngeal sensation. During phonation, the vocal folds were assesses for glottal closure; mucosal wave; vocal fold lesions; vibratory periodicity, amplitude, and phase symmetry; and vertical height match. The equipment was removed. The patient tolerated the procedure well without complication. All findings were normal except:  - right vocal process granuloma, unilobular, narrow pedicle; interval marked decrease in size  - complete glottal closure; pliability intact; symmetric vibratory parameters  - resolution of supraglottic hyperfunction      Assessment:     Juan Green is a 36 y.o. 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 This failed conservative/medical therapy.  His symptoms and his laryngeal exam show significant improvement 2 months following laryngeal botox injection.     Plan:     Reassurance was provided. I reminded him that the effects of the botox would still be expected to be present for the next 2-4 months. He will follow up with me in 3-4 months, or sooner if needed.    Bulmaro Carty M.D.  [facility]  Department of Otorhinolaryngology and Communication Sciences

## 2017-08-09 ENCOUNTER — PATIENT MESSAGE (OUTPATIENT)
Dept: FAMILY MEDICINE | Facility: CLINIC | Age: 37
End: 2017-08-09

## 2017-09-22 RX ORDER — FLUTICASONE PROPIONATE 50 MCG
2 SPRAY, SUSPENSION (ML) NASAL DAILY
Qty: 16 G | Refills: 2 | Status: SHIPPED | OUTPATIENT
Start: 2017-09-22 | End: 2018-01-06 | Stop reason: SDUPTHER

## 2017-10-09 ENCOUNTER — OFFICE VISIT (OUTPATIENT)
Dept: FAMILY MEDICINE | Facility: CLINIC | Age: 37
End: 2017-10-09
Payer: COMMERCIAL

## 2017-10-09 VITALS
RESPIRATION RATE: 18 BRPM | BODY MASS INDEX: 24.86 KG/M2 | HEART RATE: 92 BPM | SYSTOLIC BLOOD PRESSURE: 136 MMHG | DIASTOLIC BLOOD PRESSURE: 84 MMHG | HEIGHT: 68 IN | TEMPERATURE: 97 F | WEIGHT: 164 LBS

## 2017-10-09 DIAGNOSIS — L72.3 INFLAMED SEBACEOUS CYST: Primary | ICD-10-CM

## 2017-10-09 PROCEDURE — 10061 I&D ABSCESS COMP/MULTIPLE: CPT | Mod: S$GLB,,, | Performed by: FAMILY MEDICINE

## 2017-10-09 PROCEDURE — 99999 PR PBB SHADOW E&M-EST. PATIENT-LVL III: CPT | Mod: PBBFAC,,, | Performed by: FAMILY MEDICINE

## 2017-10-09 PROCEDURE — 99212 OFFICE O/P EST SF 10 MIN: CPT | Mod: 25,S$GLB,, | Performed by: FAMILY MEDICINE

## 2017-10-09 NOTE — PROGRESS NOTES
CHIEF COMPLAINT: This is 36-year-old male complaining of painful cyst.    SUBJECTIVE: Patient complains of small nodule at the base of neck which has gotten larger and more discolored over the last few days.  Nodule has been present for the last 1-2 years.  Patient complains of slight redness and warmth.  He denies fever or chills.    ROS:  GENERAL: Patient denies fever, chills, night sweats.  Patient denies weight gain or loss. Patient denies anorexia, fatigue, weakness or swollen glands.  SKIN: Patient denies rash.  LUNGS: Patient denies cough, wheeze or hemoptysis.  CARDIOVASCULAR: Patient denies chest pain, shortness of breath, palpitations, syncope or lower extremity edema.    OBJECTIVE:   GENERAL: Well-developed well-nourished white male alert and oriented x3 in no acute distress.  Memory, judgment and cognition without deficit.  SKIN: Inflamed sebaceous cyst, upper back at base of neck.  HEENT: Eyes: Clear conjunctivae.  No scleral icterus.    NECK: Supple, normal range of motion.  No lymphadenopathy.   LUNGS: Normal respiratory effort.  BACK: No CVA or spinal tenderness.    Discussed potential complications of procedure including bleeding, infection, and injury to nerve.  Patient understands and accepts risks.  Verbal consent obtained.    Procedure: After sterile prep and drape, area anesthetized with 1% Xylocaine with epinephrine.  Lesion incised with #11 blade.  Small amount of purulent discharge expressed followed by copious amounts of sebum.  Cavity explored and irrigated with sterile water.  Cyst wall teased out with hemostats.  Cavity packed with 1/4 inch plain packing.  Dressing placed.    ASSESSMENT:  1. Inflamed sebaceous cyst      PLAN:   1.  Remove packing in 24-48 hours.  2.  Local care instructions.  3.  OTC analgesics as needed for pain.  4.  Follow-up if no improvement or worsening symptoms.

## 2017-10-19 ENCOUNTER — OFFICE VISIT (OUTPATIENT)
Dept: OTOLARYNGOLOGY | Facility: CLINIC | Age: 37
End: 2017-10-19
Payer: COMMERCIAL

## 2017-10-19 VITALS
TEMPERATURE: 97 F | BODY MASS INDEX: 24.81 KG/M2 | WEIGHT: 163.13 LBS | DIASTOLIC BLOOD PRESSURE: 82 MMHG | SYSTOLIC BLOOD PRESSURE: 137 MMHG | HEART RATE: 73 BPM

## 2017-10-19 DIAGNOSIS — R49.9 VOICE DISTURBANCE: Primary | ICD-10-CM

## 2017-10-19 DIAGNOSIS — J38.3 VOCAL PROCESS GRANULOMA: ICD-10-CM

## 2017-10-19 PROBLEM — J38.5 SPASM LARYNX: Status: RESOLVED | Noted: 2017-04-05 | Resolved: 2017-10-19

## 2017-10-19 PROCEDURE — 99999 PR PBB SHADOW E&M-EST. PATIENT-LVL III: CPT | Mod: PBBFAC,,, | Performed by: OTOLARYNGOLOGY

## 2017-10-19 PROCEDURE — 99499 UNLISTED E&M SERVICE: CPT | Mod: S$GLB,,, | Performed by: OTOLARYNGOLOGY

## 2017-10-19 PROCEDURE — 31579 LARYNGOSCOPY TELESCOPIC: CPT | Mod: S$GLB,,, | Performed by: OTOLARYNGOLOGY

## 2017-10-19 NOTE — PROGRESS NOTES
[facility]  Department of Otorhinolaryngology and Communication Sciences    Subjective:      Juan Green is a 36 y.o. male who presents for follow-up. 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 Path was benign. It was refractory to conservative/medical management.    He underwent laryngeal botox injection 4/19/2017. At this time, he reports his voice is completely back to normal. He notes no vocal impairment, nor is he bothered by any throat symptoms. He is pleased with his progress. He has resumed singing recreationally, but not in the strained postures he employed previously.    VHI-10 = 2 (down from 15)  RSI = 1 (down from 7)  EAT-10 = 0.    The patient's medications, allergies, past medical, surgical, social and family histories were reviewed and updated as appropriate.    A detailed review of systems was obtained with pertinent positives as per the above HPI, and otherwise negative.      Objective:     /82   Pulse 73   Temp 97.1 °F (36.2 °C)   Wt 74 kg (163 lb 2.3 oz)   BMI 24.81 kg/m²      Constitutional: comfortable, well dressed  Psychiatric: appropriate affect  Respiratory: comfortably breathing, symmetric chest rise, no stridor  Voice: normal for age/gender  Head: normocephalic  Eyes: conjunctivae and sclerae clear  Indirect laryngoscopy is limited due to gag    Procedure  Rigid Laryngeal Videostroboscopy (69896): Laryngeal videostroboscopy is indicated to assess the laryngeal vibratory biomechanics and vocal fold oscillation, which cannot be assessed with a plain light examination. This was carried out with a 70 degree endoscope. After verbal consent was obtained, the patient was positioned and the tongue was gently secured with a gauze sponge. The endoscope was passed transorally and positioned to image the larynx and hypopharynx in detail. The following featured were examined: laryngeal and hypopharyngeal masses; vocal fold  range and symmetry of motion; laryngeal mucosal edema, erythema, inflammation, and hydration; salivary pooling; and gross laryngeal sensation. During phonation, the vocal folds were assesses for glottal closure; mucosal wave; vocal fold lesions; vibratory periodicity, amplitude, and phase symmetry; and vertical height match. The equipment was removed. The patient tolerated the procedure well without complication. All findings were normal except:  - right vocal process with scant polypoid granulation; continued dramatic size reduction  - complete glottal closure; pliability intact; symmetric vibratory parameters  - resolution of supraglottic hyperfunction        Assessment:     Juan Green is a 36 y.o. male with a history of a recurrent right vocal process granuloma which has responded well to laryngeal botox injection.     Plan:     Reassurance was provided. Further management to consider would be SLP voice evaluation and/or working with a vocal . He defers at this time.    He will follow up with me in the future on an as-needed basis.    Bulmaro Carty M.D.  [facility]  Department of Otorhinolaryngology and Communication Sciences

## 2018-01-07 RX ORDER — FLUTICASONE PROPIONATE 50 MCG
SPRAY, SUSPENSION (ML) NASAL
Qty: 1 BOTTLE | Refills: 3 | Status: SHIPPED | OUTPATIENT
Start: 2018-01-07 | End: 2018-07-11 | Stop reason: SDUPTHER

## 2018-02-05 ENCOUNTER — OFFICE VISIT (OUTPATIENT)
Dept: FAMILY MEDICINE | Facility: CLINIC | Age: 38
End: 2018-02-05
Payer: COMMERCIAL

## 2018-02-05 VITALS
OXYGEN SATURATION: 100 % | TEMPERATURE: 98 F | HEART RATE: 80 BPM | SYSTOLIC BLOOD PRESSURE: 128 MMHG | RESPIRATION RATE: 18 BRPM | HEIGHT: 68 IN | DIASTOLIC BLOOD PRESSURE: 76 MMHG | WEIGHT: 166 LBS | BODY MASS INDEX: 25.16 KG/M2

## 2018-02-05 DIAGNOSIS — J06.9 ACUTE URI: Primary | ICD-10-CM

## 2018-02-05 LAB
CTP QC/QA: YES
S PYO RRNA THROAT QL PROBE: NEGATIVE

## 2018-02-05 PROCEDURE — 3008F BODY MASS INDEX DOCD: CPT | Mod: S$GLB,,, | Performed by: FAMILY MEDICINE

## 2018-02-05 PROCEDURE — 99213 OFFICE O/P EST LOW 20 MIN: CPT | Mod: S$GLB,,, | Performed by: FAMILY MEDICINE

## 2018-02-05 PROCEDURE — 99999 PR PBB SHADOW E&M-EST. PATIENT-LVL III: CPT | Mod: PBBFAC,,, | Performed by: FAMILY MEDICINE

## 2018-02-05 PROCEDURE — 87880 STREP A ASSAY W/OPTIC: CPT | Mod: QW,S$GLB,, | Performed by: FAMILY MEDICINE

## 2018-02-05 NOTE — PROGRESS NOTES
CHIEF COMPLAINT: This is a 37-year-old male complaining of respiratory illness.    SUBJECTIVE: Patient had onset of sore throat and nasal congestion.  4 days ago.  He complains of white spots at the back of his throat, but denies odynophagia.  He began having cough yesterday productive of yellow mucus.  Patient is starting to get worse.  He complains of achiness, without fever.  He denies chest congestion, shortness of breath or wheezing.  Positive ill contacts.  He did not get the flu vaccine.  He has taken no medications for his illness.  He uses Flonase inhaler daily.    ROS:  GENERAL: Patient denies fever, chills, night sweats.  Patient denies weight gain or loss. Patient denies anorexia, fatigue, weakness or swollen glands.  SKIN: Patient denies rash.  HEENT: Patient denies, ear pain, hearing loss visual disturbance, eye irritation or discharge.  LUNGS: Patient denies wheeze or hemoptysis.  CARDIOVASCULAR: Patient denies chest pain, shortness of breath, palpitations, syncope or lower extremity edema.  GI: Patient denies abdominal pain, nausea, vomiting, diarrhea, constipation, blood in stool or melena.    OBJECTIVE:   GENERAL: Well-developed well-nourished, white male alert and oriented x3 in no acute distress.  Memory, judgment and cognition without deficit.  Stuffy sounding.  No audible wheezing.  SKIN: Clear without rash.  Normal color and tone.  HEENT: Eyes: Clear conjunctivae.  No scleral icterus.  Ears: Clear canals.  Clear TMs.  Nose: Moderate bilateral congestion.  Pharynx: 2+ injection.  No exudates.  Positive tonsillar stones.  NECK: Supple, normal range of motion.  No lymphadenopathy.    LUNGS: Clear to auscultation.  Normal respiratory effort.  CARDIOVASCULAR: Regular rhythm, normal S1, S2 without murmur, gallop or rub.     Rapid strep screen: Negative.    ASSESSMENT:  1. Acute URI      PLAN:   1.  Symptomatic treatment.  2.  Use nasal decongestant spray prior to airplane flight.   3.  Follow-up if  no improvement or worsening symptoms.

## 2018-04-03 ENCOUNTER — OFFICE VISIT (OUTPATIENT)
Dept: FAMILY MEDICINE | Facility: CLINIC | Age: 38
End: 2018-04-03
Payer: COMMERCIAL

## 2018-04-03 VITALS
DIASTOLIC BLOOD PRESSURE: 84 MMHG | BODY MASS INDEX: 24.91 KG/M2 | OXYGEN SATURATION: 98 % | WEIGHT: 164.38 LBS | HEART RATE: 73 BPM | HEIGHT: 68 IN | RESPIRATION RATE: 18 BRPM | TEMPERATURE: 99 F | SYSTOLIC BLOOD PRESSURE: 138 MMHG

## 2018-04-03 DIAGNOSIS — J30.9 ALLERGIC RHINITIS, UNSPECIFIED CHRONICITY, UNSPECIFIED SEASONALITY, UNSPECIFIED TRIGGER: ICD-10-CM

## 2018-04-03 DIAGNOSIS — H10.9 CONJUNCTIVITIS, UNSPECIFIED CONJUNCTIVITIS TYPE, UNSPECIFIED LATERALITY: Primary | ICD-10-CM

## 2018-04-03 PROCEDURE — 99999 PR PBB SHADOW E&M-EST. PATIENT-LVL IV: CPT | Mod: PBBFAC,,, | Performed by: INTERNAL MEDICINE

## 2018-04-03 PROCEDURE — 99213 OFFICE O/P EST LOW 20 MIN: CPT | Mod: S$GLB,,, | Performed by: INTERNAL MEDICINE

## 2018-04-03 RX ORDER — GENTAMICIN SULFATE 3 MG/ML
1 SOLUTION/ DROPS OPHTHALMIC 3 TIMES DAILY
Qty: 5 ML | Refills: 0 | Status: SHIPPED | OUTPATIENT
Start: 2018-04-03 | End: 2018-04-03 | Stop reason: SDUPTHER

## 2018-04-03 RX ORDER — AMOXICILLIN AND CLAVULANATE POTASSIUM 875; 125 MG/1; MG/1
1 TABLET, FILM COATED ORAL 2 TIMES DAILY
Qty: 20 TABLET | Refills: 0 | Status: SHIPPED | OUTPATIENT
Start: 2018-04-03 | End: 2018-04-13

## 2018-04-03 RX ORDER — GENTAMICIN SULFATE 3 MG/ML
1 SOLUTION/ DROPS OPHTHALMIC 3 TIMES DAILY
Qty: 5 ML | Refills: 0 | Status: SHIPPED | OUTPATIENT
Start: 2018-04-03

## 2018-04-03 NOTE — PROGRESS NOTES
Subjective:       Patient ID: Juan Green is a 37 y.o. male.    Chief Complaint: Eye Problem    Eye Problem    The right eye is affected. The current episode started in the past 7 days. The problem has been waxing and waning. There was no injury mechanism. The pain is at a severity of 0/10. The patient is experiencing no pain. Associated symptoms include an eye discharge, eye redness and itching. Pertinent negatives include no fever, nausea or vomiting.     Past Medical History:   Diagnosis Date    Allergy     Asthma     childhood    Seizure disorder     Last seizure 2007    Vocal cord granuloma      No past surgical history on file.  Family History   Problem Relation Age of Onset    Asthma Mother     Mitral valve prolapse Mother     Celiac disease Father     Hypertension Father     Stroke Neg Hx     Diabetes Neg Hx      Social History     Social History    Marital status:      Spouse name: N/A    Number of children: N/A    Years of education: N/A     Occupational History    Not on file.     Social History Main Topics    Smoking status: Former Smoker     Quit date: 01/2016    Smokeless tobacco: Never Used    Alcohol use No    Drug use: No    Sexual activity: Yes     Other Topics Concern    Not on file     Social History Narrative    Employed as a counselor.     Review of Systems   Constitutional: Negative for appetite change and fever.   HENT: Negative for congestion, rhinorrhea, sneezing and sore throat.    Eyes: Positive for discharge, redness and itching.   Respiratory: Negative for apnea, cough, choking, chest tightness, shortness of breath, wheezing and stridor.    Cardiovascular: Negative for chest pain, palpitations and leg swelling.   Gastrointestinal: Negative for abdominal pain, nausea and vomiting.   Genitourinary: Negative.    Musculoskeletal: Negative.    Neurological: Negative.        Objective:      Physical Exam   Constitutional: He is oriented to person, place,  and time. He appears well-developed and well-nourished.   Eyes: Pupils are equal, round, and reactive to light. Right eye exhibits discharge.   Erythema conjunctiva.     Swelling medial right eye.   Cardiovascular: Normal rate, regular rhythm, normal heart sounds and intact distal pulses.    Pulmonary/Chest: Effort normal and breath sounds normal.   Abdominal: Soft. Bowel sounds are normal.   Musculoskeletal: Normal range of motion.   Neurological: He is alert and oriented to person, place, and time.   Psychiatric: He has a normal mood and affect. His behavior is normal. Judgment and thought content normal.   Nursing note and vitals reviewed.      CMP  Sodium   Date Value Ref Range Status   11/07/2016 142 136 - 145 mmol/L Final     Potassium   Date Value Ref Range Status   11/07/2016 4.9 3.5 - 5.1 mmol/L Final     Chloride   Date Value Ref Range Status   11/07/2016 106 95 - 110 mmol/L Final     CO2   Date Value Ref Range Status   11/07/2016 27 23 - 29 mmol/L Final     Glucose   Date Value Ref Range Status   11/07/2016 111 (H) 70 - 110 mg/dL Final     BUN, Bld   Date Value Ref Range Status   11/07/2016 15 6 - 20 mg/dL Final     Creatinine   Date Value Ref Range Status   11/07/2016 1.0 0.5 - 1.4 mg/dL Final     Calcium   Date Value Ref Range Status   11/07/2016 10.0 8.7 - 10.5 mg/dL Final     Total Protein   Date Value Ref Range Status   08/16/2012 7.0 6.0 - 8.4 g/dL Final     Albumin   Date Value Ref Range Status   08/16/2012 4.2 3.5 - 5.2 g/dl Final     Total Bilirubin   Date Value Ref Range Status   08/16/2012 0.4 0.1 - 1.0 mg/dl Final     Comment:     For infants and newborns, interpretation of results should be based  on gestational age, weight and in agreement with clinical  observations.  .  Premature Infant recommended reference ranges:  Up to 24 hours.............<8.0 mg/dl  Up to 48 hours............<12.0 mg/dl  3-5 days..................<15.0 mg/dl  6-29 days.................<15.0 mg/dl     Alkaline  Phosphatase   Date Value Ref Range Status   08/16/2012 54 (L) 55 - 135 U/L Final     AST   Date Value Ref Range Status   08/16/2012 17 10 - 40 U/L Final     ALT   Date Value Ref Range Status   08/16/2012 16 10 - 44 U/L Final     Anion Gap   Date Value Ref Range Status   11/07/2016 9 8 - 16 mmol/L Final     eGFR if    Date Value Ref Range Status   11/07/2016 >60 >60 mL/min/1.73 m^2 Final     eGFR if non    Date Value Ref Range Status   11/07/2016 >60 >60 mL/min/1.73 m^2 Final     Comment:     Calculation used to obtain the estimated glomerular filtration  rate (eGFR) is the CKD-EPI equation. Since race is unknown   in our information system, the eGFR values for   -American and Non--American patients are given   for each creatinine result.       Lab Results   Component Value Date    WBC 6.25 11/07/2016    HGB 15.2 11/07/2016    HCT 43.8 11/07/2016    MCV 87 11/07/2016     11/07/2016     Lab Results   Component Value Date    CHOL 179 08/16/2012     Lab Results   Component Value Date    HDL 48 08/16/2012     Lab Results   Component Value Date    LDLCALC 122.0 08/16/2012     Lab Results   Component Value Date    TRIG 45 08/16/2012     Lab Results   Component Value Date    CHOLHDL 26.8 08/16/2012     Lab Results   Component Value Date    TSH 1.027 08/16/2012     Lab Results   Component Value Date    HGBA1C 5.2 08/16/2012     Assessment:       1. Conjunctivitis, unspecified conjunctivitis type, unspecified laterality    2. Allergic rhinitis, unspecified chronicity, unspecified seasonality, unspecified trigger        Plan:   Conjunctivitis, unspecified conjunctivitis type, unspecified laterality  -     amoxicillin-clavulanate 875-125mg (AUGMENTIN) 875-125 mg per tablet; Take 1 tablet by mouth 2 (two) times daily.  Dispense: 20 tablet; Refill: 0  -     gentamicin (GARAMYCIN) 0.3 % ophthalmic solution; Place 1 drop into the right eye 3 (three) times daily.  Dispense: 5 mL;  Refill: 0    Allergic rhinitis, unspecified chronicity, unspecified seasonality, unspecified trigger-------------------------allegra or zyrtec.                           Call if worsens or persists.

## 2018-04-30 RX ORDER — PANTOPRAZOLE SODIUM 40 MG/1
40 TABLET, DELAYED RELEASE ORAL 2 TIMES DAILY
Qty: 30 TABLET | Refills: 10 | Status: SHIPPED | OUTPATIENT
Start: 2018-04-30 | End: 2019-04-30

## 2018-06-04 ENCOUNTER — LAB VISIT (OUTPATIENT)
Dept: LAB | Facility: HOSPITAL | Age: 38
End: 2018-06-04
Attending: PSYCHIATRY & NEUROLOGY
Payer: COMMERCIAL

## 2018-06-04 DIAGNOSIS — G40.909 EPILEPSY: Primary | ICD-10-CM

## 2018-06-04 LAB
ALBUMIN SERPL BCP-MCNC: 4.5 G/DL
ALP SERPL-CCNC: 53 U/L
ALT SERPL W/O P-5'-P-CCNC: 20 U/L
ANION GAP SERPL CALC-SCNC: 7 MMOL/L
AST SERPL-CCNC: 17 U/L
BASOPHILS # BLD AUTO: 0.05 K/UL
BASOPHILS NFR BLD: 0.9 %
BILIRUB SERPL-MCNC: 0.4 MG/DL
BUN SERPL-MCNC: 20 MG/DL
CALCIUM SERPL-MCNC: 9.7 MG/DL
CARBAMAZEPINE SERPL-MCNC: 5.5 UG/ML
CHLORIDE SERPL-SCNC: 104 MMOL/L
CO2 SERPL-SCNC: 28 MMOL/L
CREAT SERPL-MCNC: 1.1 MG/DL
DIFFERENTIAL METHOD: ABNORMAL
EOSINOPHIL # BLD AUTO: 0.2 K/UL
EOSINOPHIL NFR BLD: 2.8 %
ERYTHROCYTE [DISTWIDTH] IN BLOOD BY AUTOMATED COUNT: 13.1 %
EST. GFR  (AFRICAN AMERICAN): >60 ML/MIN/1.73 M^2
EST. GFR  (NON AFRICAN AMERICAN): >60 ML/MIN/1.73 M^2
GLUCOSE SERPL-MCNC: 91 MG/DL
HCT VFR BLD AUTO: 42.3 %
HGB BLD-MCNC: 13.8 G/DL
IMM GRANULOCYTES # BLD AUTO: 0 K/UL
IMM GRANULOCYTES NFR BLD AUTO: 0 %
LYMPHOCYTES # BLD AUTO: 1.6 K/UL
LYMPHOCYTES NFR BLD: 29.8 %
MCH RBC QN AUTO: 29 PG
MCHC RBC AUTO-ENTMCNC: 32.6 G/DL
MCV RBC AUTO: 89 FL
MONOCYTES # BLD AUTO: 0.5 K/UL
MONOCYTES NFR BLD: 8.9 %
NEUTROPHILS # BLD AUTO: 3 K/UL
NEUTROPHILS NFR BLD: 57.6 %
NRBC BLD-RTO: 0 /100 WBC
PLATELET # BLD AUTO: 184 K/UL
PMV BLD AUTO: 12.6 FL
POTASSIUM SERPL-SCNC: 5 MMOL/L
PROT SERPL-MCNC: 7.3 G/DL
RBC # BLD AUTO: 4.76 M/UL
SODIUM SERPL-SCNC: 139 MMOL/L
WBC # BLD AUTO: 5.27 K/UL

## 2018-06-04 PROCEDURE — 36415 COLL VENOUS BLD VENIPUNCTURE: CPT | Mod: PO

## 2018-06-04 PROCEDURE — 85025 COMPLETE CBC W/AUTO DIFF WBC: CPT

## 2018-06-04 PROCEDURE — 80053 COMPREHEN METABOLIC PANEL: CPT

## 2018-06-04 PROCEDURE — 80156 ASSAY CARBAMAZEPINE TOTAL: CPT

## 2018-06-08 ENCOUNTER — PATIENT MESSAGE (OUTPATIENT)
Dept: FAMILY MEDICINE | Facility: CLINIC | Age: 38
End: 2018-06-08

## 2018-07-11 RX ORDER — FLUTICASONE PROPIONATE 50 MCG
SPRAY, SUSPENSION (ML) NASAL
Qty: 16 ML | Refills: 1 | Status: SHIPPED | OUTPATIENT
Start: 2018-07-11 | End: 2018-09-20 | Stop reason: SDUPTHER

## 2018-09-20 RX ORDER — FLUTICASONE PROPIONATE 50 MCG
SPRAY, SUSPENSION (ML) NASAL
Qty: 16 ML | Refills: 1 | Status: SHIPPED | OUTPATIENT
Start: 2018-09-20 | End: 2018-11-28 | Stop reason: SDUPTHER

## 2018-11-26 RX ORDER — PANTOPRAZOLE SODIUM 40 MG/1
40 TABLET, DELAYED RELEASE ORAL 2 TIMES DAILY
Qty: 30 TABLET | Refills: 10 | OUTPATIENT
Start: 2018-11-26 | End: 2019-11-26

## 2018-11-28 RX ORDER — FLUTICASONE PROPIONATE 50 MCG
SPRAY, SUSPENSION (ML) NASAL
Qty: 16 ML | Refills: 1 | Status: SHIPPED | OUTPATIENT
Start: 2018-11-28 | End: 2019-01-07 | Stop reason: SDUPTHER

## 2018-12-31 RX ORDER — FLUTICASONE PROPIONATE 50 MCG
2 SPRAY, SUSPENSION (ML) NASAL DAILY
Qty: 16 ML | Refills: 1 | Status: CANCELLED | OUTPATIENT
Start: 2018-12-31

## 2019-01-07 RX ORDER — FLUTICASONE PROPIONATE 50 MCG
2 SPRAY, SUSPENSION (ML) NASAL DAILY
Qty: 48 ML | Refills: 1 | Status: SHIPPED | OUTPATIENT
Start: 2019-01-07 | End: 2019-09-07 | Stop reason: SDUPTHER

## 2019-09-08 RX ORDER — FLUTICASONE PROPIONATE 50 MCG
SPRAY, SUSPENSION (ML) NASAL
Qty: 16 ML | Refills: 0 | Status: SHIPPED | OUTPATIENT
Start: 2019-09-08 | End: 2019-10-21 | Stop reason: SDUPTHER

## 2019-10-21 RX ORDER — FLUTICASONE PROPIONATE 50 MCG
SPRAY, SUSPENSION (ML) NASAL
Qty: 16 ML | Refills: 2 | Status: SHIPPED | OUTPATIENT
Start: 2019-10-21 | End: 2020-02-10

## 2020-02-10 RX ORDER — FLUTICASONE PROPIONATE 50 MCG
SPRAY, SUSPENSION (ML) NASAL
Qty: 16 ML | Refills: 0 | Status: SHIPPED | OUTPATIENT
Start: 2020-02-10 | End: 2020-03-16

## 2020-03-16 RX ORDER — FLUTICASONE PROPIONATE 50 MCG
SPRAY, SUSPENSION (ML) NASAL
Qty: 16 ML | Refills: 0 | Status: SHIPPED | OUTPATIENT
Start: 2020-03-16

## 2020-04-22 RX ORDER — FLUTICASONE PROPIONATE 50 MCG
SPRAY, SUSPENSION (ML) NASAL
Qty: 16 ML | Refills: 0 | OUTPATIENT
Start: 2020-04-22

## 2020-04-22 NOTE — TELEPHONE ENCOUNTER
Patient advised that he'll need to schedule a virtual visit before prescription is filled. He verbally verified understanding and said he will download ramón and schedule the appt when he does.

## 2020-10-06 ENCOUNTER — PATIENT MESSAGE (OUTPATIENT)
Dept: ADMINISTRATIVE | Facility: HOSPITAL | Age: 40
End: 2020-10-06